# Patient Record
(demographics unavailable — no encounter records)

---

## 2025-01-09 NOTE — HISTORY OF PRESENT ILLNESS
[FreeTextEntry1] : 33-year-old female presents with evaluation of acute neck pain and syncopal episode that occurred this past Saturday. Patient reports waking up early morning with severe, sharp neck pain radiating to the occipital region. Pain was significantly exacerbated by movement. Application of heat provided no relief. While attempting to fix her hair, pain intensified substantially, leading to severe nausea and brief syncope. Per , loss of consciousness lasted few seconds. Patient fell on right side during syncopal episode. Following the incident, neck pain gradually improved but did not completely resolve. Patient has been experiencing persistent headaches since the event. Denies any obvious triggering factors such as heavy lifting or trauma prior to onset. No previous history of similar episodes. Initially visited orthopedic walk-in clinic on Monday but was redirected for neurological evaluation.

## 2025-01-09 NOTE — ASSESSMENT
[FreeTextEntry1] :  Acute cervical pain with syncope -etiology unclear, but concerning for possible carotid/vertebral artery dissection  - carotid duplex -MRI of cervical spine -if scans negative, a trial of physical therapy  head trauma -MRI of the brain